# Patient Record
Sex: FEMALE | Race: BLACK OR AFRICAN AMERICAN | NOT HISPANIC OR LATINO | Employment: FULL TIME | URBAN - METROPOLITAN AREA
[De-identification: names, ages, dates, MRNs, and addresses within clinical notes are randomized per-mention and may not be internally consistent; named-entity substitution may affect disease eponyms.]

---

## 2017-12-06 ENCOUNTER — HOSPITAL ENCOUNTER (OUTPATIENT)
Dept: PREADMISSION TESTING | Facility: HOSPITAL | Age: 32
Discharge: HOME | End: 2017-12-06
Attending: PLASTIC SURGERY
Payer: OTHER GOVERNMENT

## 2017-12-06 VITALS
TEMPERATURE: 97 F | DIASTOLIC BLOOD PRESSURE: 77 MMHG | BODY MASS INDEX: 33.68 KG/M2 | HEIGHT: 62 IN | HEART RATE: 77 BPM | WEIGHT: 183 LBS | SYSTOLIC BLOOD PRESSURE: 122 MMHG | RESPIRATION RATE: 18 BRPM

## 2017-12-06 DIAGNOSIS — Z01.818 PRE-OP TESTING: Primary | ICD-10-CM

## 2017-12-06 LAB
BASOPHILS # BLD AUTO: 0.01 K/UL
BASOPHILS NFR BLD: 0.2 %
DIFFERENTIAL METHOD: ABNORMAL
EOSINOPHIL # BLD AUTO: 0.1 K/UL
EOSINOPHIL NFR BLD: 1.8 %
ERYTHROCYTE [DISTWIDTH] IN BLOOD BY AUTOMATED COUNT: 14.6 %
HCT VFR BLD AUTO: 36 %
HGB BLD-MCNC: 11.8 G/DL
LYMPHOCYTES # BLD AUTO: 1.4 K/UL
LYMPHOCYTES NFR BLD: 31.3 %
MCH RBC QN AUTO: 27.1 PG
MCHC RBC AUTO-ENTMCNC: 32.8 G/DL
MCV RBC AUTO: 83 FL
MONOCYTES # BLD AUTO: 0.4 K/UL
MONOCYTES NFR BLD: 9.9 %
NEUTROPHILS # BLD AUTO: 2.5 K/UL
NEUTROPHILS NFR BLD: 56.8 %
PLATELET # BLD AUTO: 255 K/UL
PMV BLD AUTO: 10.3 FL
RBC # BLD AUTO: 4.35 M/UL
WBC # BLD AUTO: 4.44 K/UL

## 2017-12-06 PROCEDURE — 85025 COMPLETE CBC W/AUTO DIFF WBC: CPT

## 2017-12-06 PROCEDURE — 36415 COLL VENOUS BLD VENIPUNCTURE: CPT

## 2017-12-06 RX ORDER — PHENTERMINE HYDROCHLORIDE 37.5 MG/1
37.5 CAPSULE ORAL EVERY MORNING
COMMUNITY

## 2017-12-06 RX ORDER — HYDROXYZINE PAMOATE 25 MG/1
25 CAPSULE ORAL NIGHTLY PRN
COMMUNITY

## 2017-12-06 NOTE — DISCHARGE INSTRUCTIONS
Your surgery is scheduled for_____12/14/2017____________.    Call [phone] between 2 pm and 5 pm ___12/13/2017_________ to find out your arrival time for the day of surgery.    Report to SAME DAY SURGERY UNIT at _______am on the 2nd floor of the hospital.  Use the front entrance of the hospital before 6 am.  If you need wheelchair assistance, call [phone] from your cell phone,  or call 0 from the courtesy phone in the hospital lobby.    Important instructions:   Do not eat or drink after 12 midnight, including water.  It is okay to brush your teeth.  Do not have gum, candy or mints.     Take only these medications with a small swallow of water on the morning of your surgery.___none__________        For this procedure you will shower at home the night before and also the morning of surgery with HIBICLENS soap provided by the pre op nurse. Do not use this soap on your face or genitals. You will shower a third time here at the hospital on the morning of surgery. Rinse completely after each shower.  Please place clean linens on your bed the night before surgery. Please wear fresh clean clothing after each shower.  No shaving of procedural area at least 4-5 days before surgery due to increased risk of skin irritation and/or possible infection.          YOU WILL HAVE TO GIVE A URINE SPECIMEN TO THE NURSE WHO ADMITS YOU ON THE MORNING OF SURGERY.  Please do not urinate until the nurse gives you a specimen cup.  Thank you.     Do not wear make- up, including mascara.     You may wear deodorant only.      Do not wear powder, body lotion or cologne.     Do not wear any jewelry or have any metal on your body.     Please bring any documents given to you by your doctor.     If you are going home on the same day of surgery, you must have arrangements for a ride home.  You will not be able to drive home if you were given anesthesia or sedation.     Stop taking Aspirin, Ibuprofen, Motrin and Aleve at least 7 days before  your surgery. You may use Tylenol.     Stop taking fish oil and vitamin E for least 7 days before surgery.     Wear loose fitting clothes allowing for bandages.     Please leave money and valuables home.       You may bring your cell phone.     Call the doctor if fever or illness should occur before your surgery.    Call [phone] to contact us here at [facility] if needed.

## 2017-12-14 ENCOUNTER — HOSPITAL ENCOUNTER (OUTPATIENT)
Facility: HOSPITAL | Age: 32
Discharge: HOME | End: 2017-12-14
Attending: PLASTIC SURGERY | Admitting: PLASTIC SURGERY
Payer: OTHER GOVERNMENT

## 2017-12-14 ENCOUNTER — ANESTHESIA EVENT (OUTPATIENT)
Dept: SURGERY | Facility: HOSPITAL | Age: 32
End: 2017-12-14
Payer: OTHER GOVERNMENT

## 2017-12-14 ENCOUNTER — ANESTHESIA (OUTPATIENT)
Dept: SURGERY | Facility: HOSPITAL | Age: 32
End: 2017-12-14
Payer: OTHER GOVERNMENT

## 2017-12-14 VITALS
HEART RATE: 100 BPM | BODY MASS INDEX: 33.68 KG/M2 | RESPIRATION RATE: 18 BRPM | OXYGEN SATURATION: 98 % | TEMPERATURE: 98 F | SYSTOLIC BLOOD PRESSURE: 146 MMHG | WEIGHT: 183 LBS | HEIGHT: 62 IN | DIASTOLIC BLOOD PRESSURE: 77 MMHG

## 2017-12-14 DIAGNOSIS — N62 MACROMASTIA: Primary | ICD-10-CM

## 2017-12-14 LAB — B-HCG UR QL: NEGATIVE

## 2017-12-14 PROCEDURE — 25000003 PHARM REV CODE 250: Performed by: ANESTHESIOLOGY

## 2017-12-14 PROCEDURE — 71000039 HC RECOVERY, EACH ADD'L HOUR: Performed by: PLASTIC SURGERY

## 2017-12-14 PROCEDURE — 88305 TISSUE EXAM BY PATHOLOGIST: CPT | Mod: 26,,,

## 2017-12-14 PROCEDURE — 81025 URINE PREGNANCY TEST: CPT

## 2017-12-14 PROCEDURE — 25000003 PHARM REV CODE 250: Performed by: NURSE ANESTHETIST, CERTIFIED REGISTERED

## 2017-12-14 PROCEDURE — 37000009 HC ANESTHESIA EA ADD 15 MINS: Performed by: PLASTIC SURGERY

## 2017-12-14 PROCEDURE — 63600175 PHARM REV CODE 636 W HCPCS: Performed by: NURSE ANESTHETIST, CERTIFIED REGISTERED

## 2017-12-14 PROCEDURE — 71000033 HC RECOVERY, INTIAL HOUR: Performed by: PLASTIC SURGERY

## 2017-12-14 PROCEDURE — 37000008 HC ANESTHESIA 1ST 15 MINUTES: Performed by: PLASTIC SURGERY

## 2017-12-14 PROCEDURE — 25000003 PHARM REV CODE 250: Performed by: PLASTIC SURGERY

## 2017-12-14 PROCEDURE — 63600175 PHARM REV CODE 636 W HCPCS: Performed by: PLASTIC SURGERY

## 2017-12-14 PROCEDURE — 88305 TISSUE EXAM BY PATHOLOGIST: CPT

## 2017-12-14 PROCEDURE — 71000015 HC POSTOP RECOV 1ST HR: Performed by: PLASTIC SURGERY

## 2017-12-14 PROCEDURE — 71000016 HC POSTOP RECOV ADDL HR: Performed by: PLASTIC SURGERY

## 2017-12-14 PROCEDURE — 36000707: Performed by: PLASTIC SURGERY

## 2017-12-14 PROCEDURE — D9220A PRA ANESTHESIA: Mod: ,,, | Performed by: ANESTHESIOLOGY

## 2017-12-14 PROCEDURE — 63600175 PHARM REV CODE 636 W HCPCS: Performed by: ANESTHESIOLOGY

## 2017-12-14 PROCEDURE — 36000706: Performed by: PLASTIC SURGERY

## 2017-12-14 RX ORDER — PHENYLEPHRINE HYDROCHLORIDE 10 MG/ML
INJECTION INTRAVENOUS
Status: DISCONTINUED | OUTPATIENT
Start: 2017-12-14 | End: 2017-12-14

## 2017-12-14 RX ORDER — MEPERIDINE HYDROCHLORIDE 50 MG/ML
12.5 INJECTION INTRAMUSCULAR; INTRAVENOUS; SUBCUTANEOUS ONCE AS NEEDED
Status: DISCONTINUED | OUTPATIENT
Start: 2017-12-14 | End: 2017-12-14 | Stop reason: HOSPADM

## 2017-12-14 RX ORDER — ROCURONIUM BROMIDE 10 MG/ML
INJECTION, SOLUTION INTRAVENOUS
Status: DISCONTINUED | OUTPATIENT
Start: 2017-12-14 | End: 2017-12-14

## 2017-12-14 RX ORDER — SODIUM CHLORIDE 0.9 % (FLUSH) 0.9 %
3 SYRINGE (ML) INJECTION
Status: DISCONTINUED | OUTPATIENT
Start: 2017-12-14 | End: 2017-12-14 | Stop reason: HOSPADM

## 2017-12-14 RX ORDER — DEXAMETHASONE SODIUM PHOSPHATE 4 MG/ML
INJECTION, SOLUTION INTRA-ARTICULAR; INTRALESIONAL; INTRAMUSCULAR; INTRAVENOUS; SOFT TISSUE
Status: DISCONTINUED | OUTPATIENT
Start: 2017-12-14 | End: 2017-12-14

## 2017-12-14 RX ORDER — LIDOCAINE HCL/PF 100 MG/5ML
SYRINGE (ML) INTRAVENOUS
Status: DISCONTINUED | OUTPATIENT
Start: 2017-12-14 | End: 2017-12-14

## 2017-12-14 RX ORDER — ACETAMINOPHEN 10 MG/ML
1000 INJECTION, SOLUTION INTRAVENOUS ONCE
Status: COMPLETED | OUTPATIENT
Start: 2017-12-14 | End: 2017-12-14

## 2017-12-14 RX ORDER — BACITRACIN 50000 [IU]/1
INJECTION, POWDER, FOR SOLUTION INTRAMUSCULAR
Status: DISCONTINUED | OUTPATIENT
Start: 2017-12-14 | End: 2017-12-14 | Stop reason: HOSPADM

## 2017-12-14 RX ORDER — SODIUM CHLORIDE, SODIUM LACTATE, POTASSIUM CHLORIDE, CALCIUM CHLORIDE 600; 310; 30; 20 MG/100ML; MG/100ML; MG/100ML; MG/100ML
INJECTION, SOLUTION INTRAVENOUS CONTINUOUS
Status: DISCONTINUED | OUTPATIENT
Start: 2017-12-14 | End: 2017-12-14 | Stop reason: HOSPADM

## 2017-12-14 RX ORDER — LIDOCAINE HYDROCHLORIDE AND EPINEPHRINE 5; 5 MG/ML; UG/ML
INJECTION, SOLUTION INFILTRATION; PERINEURAL
Status: DISCONTINUED | OUTPATIENT
Start: 2017-12-14 | End: 2017-12-14 | Stop reason: HOSPADM

## 2017-12-14 RX ORDER — MORPHINE SULFATE 8 MG/ML
2 INJECTION INTRAMUSCULAR; INTRAVENOUS; SUBCUTANEOUS EVERY 5 MIN PRN
Status: COMPLETED | OUTPATIENT
Start: 2017-12-14 | End: 2017-12-14

## 2017-12-14 RX ORDER — LIDOCAINE HYDROCHLORIDE 20 MG/ML
JELLY TOPICAL
Status: DISCONTINUED | OUTPATIENT
Start: 2017-12-14 | End: 2017-12-14

## 2017-12-14 RX ORDER — ONDANSETRON 2 MG/ML
INJECTION INTRAMUSCULAR; INTRAVENOUS
Status: DISCONTINUED | OUTPATIENT
Start: 2017-12-14 | End: 2017-12-14

## 2017-12-14 RX ORDER — ONDANSETRON 2 MG/ML
4 INJECTION INTRAMUSCULAR; INTRAVENOUS ONCE
Status: COMPLETED | OUTPATIENT
Start: 2017-12-14 | End: 2017-12-14

## 2017-12-14 RX ORDER — GLYCOPYRROLATE 0.2 MG/ML
INJECTION INTRAMUSCULAR; INTRAVENOUS
Status: DISCONTINUED | OUTPATIENT
Start: 2017-12-14 | End: 2017-12-14

## 2017-12-14 RX ORDER — HYDROMORPHONE HYDROCHLORIDE 2 MG/ML
0.2 INJECTION, SOLUTION INTRAMUSCULAR; INTRAVENOUS; SUBCUTANEOUS EVERY 5 MIN PRN
Status: DISCONTINUED | OUTPATIENT
Start: 2017-12-14 | End: 2017-12-14

## 2017-12-14 RX ORDER — METOCLOPRAMIDE HYDROCHLORIDE 5 MG/ML
10 INJECTION INTRAMUSCULAR; INTRAVENOUS EVERY 10 MIN PRN
Status: DISCONTINUED | OUTPATIENT
Start: 2017-12-14 | End: 2017-12-14 | Stop reason: HOSPADM

## 2017-12-14 RX ORDER — CEFAZOLIN SODIUM 2 G/50ML
2 SOLUTION INTRAVENOUS
Status: COMPLETED | OUTPATIENT
Start: 2017-12-14 | End: 2017-12-14

## 2017-12-14 RX ORDER — OXYCODONE AND ACETAMINOPHEN 5; 325 MG/1; MG/1
1 TABLET ORAL
Status: DISCONTINUED | OUTPATIENT
Start: 2017-12-14 | End: 2017-12-14 | Stop reason: HOSPADM

## 2017-12-14 RX ORDER — FENTANYL CITRATE 50 UG/ML
INJECTION, SOLUTION INTRAMUSCULAR; INTRAVENOUS
Status: DISCONTINUED | OUTPATIENT
Start: 2017-12-14 | End: 2017-12-14

## 2017-12-14 RX ORDER — LIDOCAINE HYDROCHLORIDE 10 MG/ML
1 INJECTION, SOLUTION EPIDURAL; INFILTRATION; INTRACAUDAL; PERINEURAL ONCE
Status: DISCONTINUED | OUTPATIENT
Start: 2017-12-14 | End: 2017-12-14 | Stop reason: HOSPADM

## 2017-12-14 RX ORDER — MIDAZOLAM HYDROCHLORIDE 1 MG/ML
INJECTION, SOLUTION INTRAMUSCULAR; INTRAVENOUS
Status: DISCONTINUED | OUTPATIENT
Start: 2017-12-14 | End: 2017-12-14

## 2017-12-14 RX ORDER — EPINEPHRINE 1 MG/ML
INJECTION, SOLUTION INTRACARDIAC; INTRAMUSCULAR; INTRAVENOUS; SUBCUTANEOUS
Status: DISCONTINUED | OUTPATIENT
Start: 2017-12-14 | End: 2017-12-14 | Stop reason: HOSPADM

## 2017-12-14 RX ORDER — PROPOFOL 10 MG/ML
VIAL (ML) INTRAVENOUS
Status: DISCONTINUED | OUTPATIENT
Start: 2017-12-14 | End: 2017-12-14

## 2017-12-14 RX ADMIN — ROCURONIUM BROMIDE 10 MG: 10 INJECTION, SOLUTION INTRAVENOUS at 01:12

## 2017-12-14 RX ADMIN — SODIUM CHLORIDE, SODIUM LACTATE, POTASSIUM CHLORIDE, AND CALCIUM CHLORIDE: .6; .31; .03; .02 INJECTION, SOLUTION INTRAVENOUS at 10:12

## 2017-12-14 RX ADMIN — FENTANYL CITRATE 100 MCG: 50 INJECTION INTRAMUSCULAR; INTRAVENOUS at 12:12

## 2017-12-14 RX ADMIN — FENTANYL CITRATE 50 MCG: 50 INJECTION INTRAMUSCULAR; INTRAVENOUS at 03:12

## 2017-12-14 RX ADMIN — PHENYLEPHRINE HYDROCHLORIDE 200 MCG: 10 INJECTION INTRAVENOUS at 02:12

## 2017-12-14 RX ADMIN — ROCURONIUM BROMIDE 40 MG: 10 INJECTION, SOLUTION INTRAVENOUS at 11:12

## 2017-12-14 RX ADMIN — ACETAMINOPHEN 1000 MG: 10 INJECTION, SOLUTION INTRAVENOUS at 06:12

## 2017-12-14 RX ADMIN — ONDANSETRON 4 MG: 2 INJECTION, SOLUTION INTRAMUSCULAR; INTRAVENOUS at 07:12

## 2017-12-14 RX ADMIN — SODIUM CHLORIDE, SODIUM LACTATE, POTASSIUM CHLORIDE, AND CALCIUM CHLORIDE: .6; .31; .03; .02 INJECTION, SOLUTION INTRAVENOUS at 11:12

## 2017-12-14 RX ADMIN — PHENYLEPHRINE HYDROCHLORIDE 100 MCG: 10 INJECTION INTRAVENOUS at 01:12

## 2017-12-14 RX ADMIN — GLYCOPYRROLATE 0.2 MG: 0.2 INJECTION, SOLUTION INTRAMUSCULAR; INTRAVENOUS at 11:12

## 2017-12-14 RX ADMIN — PROPOFOL 200 MG: 10 INJECTION, EMULSION INTRAVENOUS at 11:12

## 2017-12-14 RX ADMIN — ONDANSETRON 4 MG: 2 INJECTION, SOLUTION INTRAMUSCULAR; INTRAVENOUS at 03:12

## 2017-12-14 RX ADMIN — MORPHINE SULFATE 2 MG: 8 INJECTION, SOLUTION INTRAMUSCULAR; INTRAVENOUS at 05:12

## 2017-12-14 RX ADMIN — MIDAZOLAM HYDROCHLORIDE 2 MG: 1 INJECTION, SOLUTION INTRAMUSCULAR; INTRAVENOUS at 11:12

## 2017-12-14 RX ADMIN — LIDOCAINE HYDROCHLORIDE 2 ML: 20 JELLY TOPICAL at 11:12

## 2017-12-14 RX ADMIN — DEXAMETHASONE SODIUM PHOSPHATE 4 MG: 4 INJECTION, SOLUTION INTRAMUSCULAR; INTRAVENOUS at 12:12

## 2017-12-14 RX ADMIN — PHENYLEPHRINE HYDROCHLORIDE 100 MCG: 10 INJECTION INTRAVENOUS at 02:12

## 2017-12-14 RX ADMIN — Medication 20 MG: at 12:12

## 2017-12-14 RX ADMIN — SODIUM CHLORIDE, SODIUM LACTATE, POTASSIUM CHLORIDE, AND CALCIUM CHLORIDE: .6; .31; .03; .02 INJECTION, SOLUTION INTRAVENOUS at 03:12

## 2017-12-14 RX ADMIN — Medication 20 MG: at 02:12

## 2017-12-14 RX ADMIN — PHENYLEPHRINE HYDROCHLORIDE 100 MCG: 10 INJECTION INTRAVENOUS at 03:12

## 2017-12-14 RX ADMIN — WHITE PETROLATUM MINERAL OIL 1 TUBE: 150; 830 OINTMENT OPHTHALMIC at 11:12

## 2017-12-14 RX ADMIN — CEFAZOLIN SODIUM 2 G: 2 SOLUTION INTRAVENOUS at 11:12

## 2017-12-14 RX ADMIN — PHENYLEPHRINE HYDROCHLORIDE 200 MCG: 10 INJECTION INTRAVENOUS at 12:12

## 2017-12-14 RX ADMIN — FENTANYL CITRATE 100 MCG: 50 INJECTION INTRAMUSCULAR; INTRAVENOUS at 11:12

## 2017-12-14 RX ADMIN — LIDOCAINE HYDROCHLORIDE 85 MG: 20 INJECTION, SOLUTION INTRAVENOUS at 11:12

## 2017-12-14 RX ADMIN — MORPHINE SULFATE 2 MG: 8 INJECTION, SOLUTION INTRAMUSCULAR; INTRAVENOUS at 06:12

## 2017-12-14 NOTE — DISCHARGE INSTRUCTIONS
Wear surgical bra at all times  No heavy lifting, pushing, or pulling  Okay to shower in 48 hours  No tub soaking   DIET: You may resume your home diet. If nausea is present, increase your diet gradually with fluids and bland foods    ACTIVITY LEVEL: You have received sedation or an anesthetic, you may feel sleepy for several hours. Rest until you are more awake. Gradually resume your normal activities    Medications: Pain medication should be taken only if needed and as directed. If antibiotics are prescribed, the medication should be taken until completed. You will be given an updated list of you medications.    No driving, alcoholic beverages or signing legal documents for next 24 hours or while taking pain medication.       CALL THE DOCTOR:    For any obvious bleeding (some dried blood over the incision is normal).      Redness, swelling, foul smell around incision or fever over 101.   Shortness of breath, Coughing up Bloody sputum, Pains or Swelling in your Calves .   Persistent pain or nausea not relieved by medication.    If any unusual problems or difficulties occur contact your doctor. If you cannot contact your doctor but feel your signs and symptoms warrant a physicians attention return to the emergency room.  Fall Prevention  Millions of people fall every year and injure themselves. You may have had anesthesia or sedation which may increase your risk of falling. You may have health issues that put you at an increased risk of falling.     Here are ways to reduce your risk of falling.  ·   · Make your home safe by keeping walkways clear of objects you may trip over.  · Use non-slip pads under rugs. Do not use area rugs or small throw rugs.  · Use non-slip mats in bathtubs and showers.  · Install handrails and lights on staircases.  · Do not walk in poorly lit areas.  · Do not stand on chairs or wobbly ladders.  · Use caution when reaching overhead or looking upward. This position can cause a loss of  balance.  · Be sure your shoes fit properly, have non-slip bottoms and are in good condition.   · Wear shoes both inside and out. Avoid going barefoot or wearing slippers.  · Be cautious when going up and down stairs, curbs, and when walking on uneven sidewalks.  · If your balance is poor, consider using a cane or walker.  · If your fall was related to alcohol use, stop or limit alcohol intake.   · If your fall was related to use of sleeping medicines, talk to your doctor about this. You may need to reduce your dosage at bedtime if you awaken during the night to go to the bathroom.    · To reduce the need for nighttime bathroom trips:  ¨ Avoid drinking fluids for several hours before going to bed  ¨ Empty your bladder before going to bed  ¨ Men can keep a urinal at the bedside  · Stay as active as you can. Balance, flexibility, strength, and endurance all come from exercise. They all play a role in preventing falls. Ask your healthcare provider which types of activity are right for you.  · Get your vision checked on a regular basis.  · If you have pets, know where they are before you stand up or walk so you don't trip over them.  · Use night lights.

## 2017-12-14 NOTE — ANESTHESIA PREPROCEDURE EVALUATION
12/14/2017  Alyse Sorenson is a 32 y.o., female.    Anesthesia Evaluation    I have reviewed the Patient Summary Reports.     I have reviewed the Medications.     Review of Systems  Anesthesia Hx:  No problems with previous Anesthesia    Social:  Non-Smoker, No Alcohol Use    Cardiovascular:   Exercise tolerance: good        Physical Exam  General:  Well nourished    Airway/Jaw/Neck:  Airway Findings: Mouth Opening: Normal Tongue: Normal  General Airway Assessment: Adult  Mallampati: II  TM Distance: Normal, at least 6 cm  Jaw/Neck Findings:  Neck ROM: Normal ROM      Dental:  Dental Findings: In tact   Chest/Lungs:  Chest/Lungs Findings: Clear to auscultation, Normal Respiratory Rate     Heart/Vascular:  Heart Findings: Rate: Normal        Mental Status:  Mental Status Findings:  Cooperative, Alert and Oriented         Anesthesia Plan  Type of Anesthesia, risks & benefits discussed:  Anesthesia Type:  general  Patient's Preference:   Intra-op Monitoring Plan: standard ASA monitors  Intra-op Monitoring Plan Comments:   Post Op Pain Control Plan: multimodal analgesia, IV/PO Opioids PRN and per primary service following discharge from PACU  Post Op Pain Control Plan Comments:   Induction:   IV  Beta Blocker:  Patient is not currently on a Beta-Blocker (No further documentation required).       Informed Consent: Patient understands risks and agrees with Anesthesia plan.  Questions answered. Anesthesia consent signed with patient.  ASA Score: 1     Day of Surgery Review of History & Physical:    H&P update referred to the surgeon.         Ready For Surgery From Anesthesia Perspective.

## 2017-12-14 NOTE — TRANSFER OF CARE
"Anesthesia Transfer of Care Note    Patient: Alyse Sorenson    Procedure(s) Performed: Procedure(s) (LRB):  REDUCTION-MAMMOPLASTY-BILATERAL (Bilateral)    Patient location: PACU    Anesthesia Type: general    Transport from OR: Transported from OR on room air with adequate spontaneous ventilation    Post pain: adequate analgesia    Post assessment: no apparent anesthetic complications and tolerated procedure well    Post vital signs: stable    Level of consciousness: sedated and responds to stimulation    Nausea/Vomiting: no nausea/vomiting    Complications: none    Transfer of care protocol was followed      Last vitals:   Visit Vitals  /67 (BP Location: Right arm, Patient Position: Lying)   Pulse 107   Temp 37.1 °C (98.8 °F) (Oral)   Resp 12   Ht 5' 2" (1.575 m)   Wt 83 kg (183 lb)   LMP 12/05/2017 (Exact Date)   SpO2 97%   BMI 33.47 kg/m²     "

## 2017-12-14 NOTE — BRIEF OP NOTE
[facility]  Brief Operative Note     SUMMARY     Surgery Date: 12/14/2017     Surgeon(s) and Role:     * Giuliano Rodriguez MD - Primary    Assisting Surgeon: None    Pre-op Diagnosis:  Chronic midline back pain, unspecified back location [M54.9, G89.29]  Macromastia [N62]    Post-op Diagnosis:  Post-Op Diagnosis Codes:     * Chronic midline back pain, unspecified back location [M54.9, G89.29]     * Macromastia [N62]    Procedure(s) (LRB):  REDUCTION-MAMMOPLASTY-BILATERAL (Bilateral)    Anesthesia: General    Description of the findings of the procedure: BBR    Findings/Key Components: Inferior pedicle breast reduction    Estimated Blood Loss: 150 mL         Specimens:   Specimen (12h ago through future)    Start     Ordered    12/14/17 1527  Specimen to Pathology - Surgery  Once     Comments:  Right breastLeft breast      12/14/17 1527          Discharge Note    SUMMARY     Admit Date: 12/14/2017    Discharge Date and Time:  12/14/2017 4:23 PM    Hospital Course (synopsis of major diagnoses, care, treatment, and services provided during the course of the hospital stay): Patient taken to OR for BBR. She tolerated the procedure well without complications. She was transferred to recovery in stable condition. She did well and was discharged when appropriate criteria was met,     Final Diagnosis: Post-Op Diagnosis Codes:     * Chronic midline back pain, unspecified back location [M54.9, G89.29]     * Macromastia [N62]    Disposition: Home or Self Care    Follow Up/Patient Instructions:     Medications:  Reconciled Home Medications:   Current Discharge Medication List      CONTINUE these medications which have NOT CHANGED    Details   acetaminophen (TYLENOL) 325 MG tablet Take 325 mg by mouth every 6 (six) hours as needed.      hydrOXYzine pamoate (VISTARIL) 25 MG Cap Take 25 mg by mouth nightly as needed.      phentermine 37.5 MG capsule Take 37.5 mg by mouth every morning.             Discharge  Procedure Orders  Diet general     Activity as tolerated     Call MD for:  temperature >100.4     Call MD for:     No dressing needed   Order Comments: Wear surgical bra at all times. Okay to shower and wash with soap and water in 48 hours. No tub soaking.       Follow-up Information     Giuliano Rodriguez MD On 12/19/2017.    Specialty:  Plastic Surgery  Contact information:  [address] [place] [address]  [phone]

## 2017-12-14 NOTE — INTERVAL H&P NOTE
The patient has been examined and the H&P has been reviewed:    I concur with the findings and no changes have occurred since H&P was written.    Anesthesia/Surgery risks, benefits and alternative options discussed and understood by patient/family.          Active Hospital Problems    Diagnosis  POA    Macromastia [N62]  Yes      Resolved Hospital Problems    Diagnosis Date Resolved POA   No resolved problems to display.

## 2017-12-14 NOTE — DISCHARGE SUMMARY
[facility]  Plastic Surgery  Discharge Summary      Patient Name: Alyse Sorenson  MRN: [id number]  Admission Date: 12/14/2017  Hospital Length of Stay: 0 days  Discharge Date and Time:  12/14/2017 4:21 PM  Attending Physician: Giuliano Rodriguez MD   Discharging Provider: Aissatou Ellington MD  Primary Care Provider: [facility]     Procedure(s) (LRB):  REDUCTION-MAMMOPLASTY-BILATERAL (Bilateral)     Hospital Course: Patient taken to OR for BBR. She tolerated the procedure well without complications. She was brought to recovery in stable condition. She did well and was discharged home when appropriate criteria was met.         Pending Diagnostic Studies:     None        Final Active Diagnoses:    Diagnosis Date Noted POA    Macromastia [N62] 12/14/2017 Yes      Problems Resolved During this Admission:    Diagnosis Date Noted Date Resolved POA      Discharged Condition: good    Disposition: Home or Self Care    Follow Up:  Follow-up Information     Giuliano Rodriguez MD On 12/19/2017.    Specialty:  Plastic Surgery  Contact information:  [address] [place] [address]  [phone]                 Patient Instructions:     Diet general     Activity as tolerated     Call MD for:  temperature >100.4     Call MD for:     No dressing needed   Order Comments: Wear surgical bra at all times. Okay to shower and wash with soap and water in 48 hours. No tub soaking.       Medications:  Reconciled Home Medications:   Current Discharge Medication List      CONTINUE these medications which have NOT CHANGED    Details   acetaminophen (TYLENOL) 325 MG tablet Take 325 mg by mouth every 6 (six) hours as needed.      hydrOXYzine pamoate (VISTARIL) 25 MG Cap Take 25 mg by mouth nightly as needed.      phentermine 37.5 MG capsule Take 37.5 mg by mouth every morning.             Aissatou Ellington MD  Plastic Surgery  [facility]

## 2017-12-15 NOTE — PLAN OF CARE
Problem: Patient Care Overview  Goal: Plan of Care Review  Outcome: Ongoing (interventions implemented as appropriate)   12/14/17 1856   Coping/Psychosocial   Plan Of Care Reviewed With patient;family     Greg score = 9/10. Drowsy; awakens to voice. Oriented x 3. VSS per flow sheet. Pain tolerable per patient. No n/v noted. Dressings to bilateral breasts cdi with surgical bra in place. Hand off report to MANAN Bell. Family directed to [facility].    Problem: Surgery Nonspecified (Adult)  Goal: Signs and Symptoms of Listed Potential Problems Will be Absent, Minimized or Managed (Surgery Nonspecified)  Signs and symptoms of listed potential problems will be absent, minimized or managed by discharge/transition of care (reference Surgery Nonspecified (Adult) CPG).   Outcome: Ongoing (interventions implemented as appropriate)   12/14/17 1856   Surgery Nonspecified   Problems Assessed (Surgery) bleeding/anemia;pain;postoperative nausea and vomiting;postoperative urinary retention;respiratory compromise;situational response   Problems Present (Surgery) pain     Goal: Anesthesia/Sedation Recovery  Outcome: Ongoing (interventions implemented as appropriate)   12/14/17 1856   Goal/Outcome Evaluation   Anesthesia/Sedation Recovery progressing toward baseline;criteria met for transfer

## 2017-12-15 NOTE — ANESTHESIA POSTPROCEDURE EVALUATION
"Anesthesia Post Evaluation    Patient: Alyse Sorenson    Procedure(s) Performed: Procedure(s) (LRB):  REDUCTION-MAMMOPLASTY-BILATERAL (Bilateral)    Final Anesthesia Type: general  Patient location during evaluation: PACU  Patient participation: Yes- Able to Participate  Level of consciousness: awake and alert and oriented  Post-procedure vital signs: reviewed and stable  Pain management: adequate  Airway patency: patent  PONV status at discharge: No PONV  Anesthetic complications: no      Cardiovascular status: blood pressure returned to baseline and hemodynamically stable  Respiratory status: unassisted, spontaneous ventilation and room air  Hydration status: euvolemic  Follow-up not needed.        Visit Vitals  BP (!) 146/77   Pulse 100   Temp 36.8 °C (98.2 °F) (Oral)   Resp 18   Ht 5' 2" (1.575 m)   Wt 83 kg (183 lb)   LMP 12/05/2017 (Exact Date)   SpO2 98%   BMI 33.47 kg/m²       Pain/Greg Score: Pain Assessment Performed: Yes (12/14/2017  8:22 PM)  Presence of Pain: complains of pain/discomfort (12/14/2017  6:15 PM)  Pain Rating Prior to Med Admin: 4 (12/14/2017  6:18 PM)  Pain Rating Post Med Admin: 4 (12/14/2017  6:15 PM)  Greg Score: 10 (12/14/2017  8:22 PM)  Modified Greg Score: 19 (12/14/2017  8:22 PM)      "

## 2023-11-28 DIAGNOSIS — Z12.31 ENCOUNTER FOR SCREENING MAMMOGRAM FOR MALIGNANT NEOPLASM OF BREAST: Primary | ICD-10-CM

## 2024-01-05 NOTE — OP NOTE
Attempted patient phone call x2 and left VM to give MRI results.     Sent message to BRIGIDA Tate who may give results to patient.   She may return to gymnastics as tolerated.     TON Morales     DATE OF SURGERY: 12/14/17    OPERATION PERFORMED:  Bilateral breast reduction.    SURGEON:  Giuliano Rodriguez MD    ASSISTANT: Aissatou Ellington MD    ANESTHESIA:  General.    BLOOD LOSS:  Less than 150 mL.    SPECIMENS:  Right breast tissue weighing 810 grams and left breast tissue weighing 960 grams.    DRAINS:  None    COMPLICATIONS:  None.    INDICATIONS FOR SURGERY:  The patient presents for symptomatic macromastia requesting breast reduction surgery. The risks and benefits of the procedure were explained to the patient, including but not limited to potential complications of bleeding, infection, healing problems, scar, breast asymmetry, loss of sensation, loss of skin, loss of nipple complex, dissatisfaction with the result, among other things. The patient expressed understanding of these risks and wished to proceed with the procedure.    DESCRIPTION OF OPERATION:  Prior to surgery the patient was marked in the holding area. We made markings for standard Dunn pattern incision with an inferior pedicle technique with a pedicle width of 9 cm, vertical inferior nipple to crease distance at 8 cm.    The patient was taken to the operating suite and placed on the operating table in the supine position. After induction of general anesthesia, antibiotic prophylaxis was given, and sequential compressive stockings were placed for DVT prophylaxis. The chest was prepped and draped in the usual sterile fashion using Betadine paint. The surgery was begun using a 42 mm diameter cookie cutter to jose out the nipple-areolar complex. We de-epithelialized the inferior pedicles using Meniscus scissors. We made an incision in the breast tissue with a 10 blade scalpel and removed breast tissue from the outer quadrant, upper quadrant, lower quadrant of each breast. The breast tissue removed weighed approximately 810 grams on the right and 960 grams on the left.  Attention was paid to hemostasis with electrocautery.    A 0-PDS was used to  tack the pedicle to the chest wall. The incisions were closed in layers using 0 Vicryl sutures in the subdermal tissue in buried fashion and  A running 0-Quill in two layers for the inframammary fold. The nipples were inset using 4-0 vicryl and then a running subcuticular 4-0 Monocryl was used. The vertical limb was closed with 3-0 vicryl subdermal interrupted sutures and running 3-0 Monocryl in a subcuticular fashion.  All skin, including nipples, remained viable. The patient had a nice cosmetic result. Dermabond tape was applied to all incisions. She was placed in a surgical bra, extubated, and taken to recovery in stable condition with no complications.

## (undated) DEVICE — SEE MEDLINE ITEM 157194

## (undated) DEVICE — SYR ONLY LUER LOCK 20CC

## (undated) DEVICE — TUBING PENADAPT ELECSURG PNCL

## (undated) DEVICE — SUPPORT ULNA NERVE PROTECTOR

## (undated) DEVICE — SEE MEDLINE ITEM 152622

## (undated) DEVICE — UNDERGLOVES BIOGEL PI SZ 6 LF

## (undated) DEVICE — BLADE SURG CARBON STEEL #10

## (undated) DEVICE — SEE MEDLINE ITEM 146417

## (undated) DEVICE — GLOVE SURGICAL LATEX SZ 7

## (undated) DEVICE — SPONGE DERMACEA GAUZE 4X4

## (undated) DEVICE — BLANKET LOWER BODY 55.9X40.2IN

## (undated) DEVICE — DRAPE STERI INSTRUMENT 1018

## (undated) DEVICE — DRESSING ANTIMICROBIAL 1 INCH

## (undated) DEVICE — PAD K-THERMIA 24IN X 60IN

## (undated) DEVICE — NDL FILTER MICRON 18G 1 1/2

## (undated) DEVICE — SYS LABLNG CORECT MED 4 FLG

## (undated) DEVICE — PAD ABD 8X10 STERILE

## (undated) DEVICE — PACK DRAPE UNIVERSAL CONVERTOR

## (undated) DEVICE — SEE MEDLINE ITEM 152531

## (undated) DEVICE — SYS CLSR DERMABOND PRINEO 22CM

## (undated) DEVICE — COVER OVERHEAD SURG LT BLUE

## (undated) DEVICE — SEE MEDLINE ITEM 157117

## (undated) DEVICE — NDL SPINAL 20GX3.5 HUB

## (undated) DEVICE — SOL 9P NACL IRR PIC IL

## (undated) DEVICE — SEE MEDLINE ITEM 146292

## (undated) DEVICE — ELECTRODE REM PLYHSV RETURN 9

## (undated) DEVICE — SUT CTD VICRYL 3-0 CR/SH

## (undated) DEVICE — SYR LUER LOCK 1CC

## (undated) DEVICE — BLADE SURG STAINLESS STEEL #15

## (undated) DEVICE — SUT MONOCRYL 3-0 SH U/D

## (undated) DEVICE — SUT PDS II 0 CR/CT-1 8-18 V

## (undated) DEVICE — CANISTER SUCTION 2 LTR

## (undated) DEVICE — SKINMARKER & RULER REGULAR X-F

## (undated) DEVICE — TUBE ASP CLR .312ID .5OD 9 LG

## (undated) DEVICE — STAPLER SKIN ROTATING HEAD

## (undated) DEVICE — CAUTERY PUSHBUTTON PENCIL

## (undated) DEVICE — SEE L#152161

## (undated) DEVICE — BRA CLASSIC COMFORT 44 BLACK

## (undated) DEVICE — SEE MEDLINE ITEM 154981

## (undated) DEVICE — SET CYSTO IRRIGATION UNIV SPIK

## (undated) DEVICE — GLOVE BIOGEL ECLIPSE SZ 6

## (undated) DEVICE — SYR 10CC LUER LOCK

## (undated) DEVICE — ELECTRODE BLD 1 INCH TEFLON

## (undated) DEVICE — Device

## (undated) DEVICE — SUT MONOCRYL 4-0 SH UND MON

## (undated) DEVICE — TRAY FOLEY 16FR INFECTION CONT

## (undated) DEVICE — SUT STRATAFIX PGAPCL 3 FS-1

## (undated) DEVICE — SPONGE LAP 18X18 PREWASHED

## (undated) DEVICE — SEE MEDLINE ITEM 146313

## (undated) DEVICE — DRESSING TRANS 2X2 TEGADERM

## (undated) DEVICE — SUT 2/0 30IN SILK BLACK

## (undated) DEVICE — CLEANER TIP ELECSURG 2X2IN